# Patient Record
Sex: FEMALE | Race: WHITE | NOT HISPANIC OR LATINO
[De-identification: names, ages, dates, MRNs, and addresses within clinical notes are randomized per-mention and may not be internally consistent; named-entity substitution may affect disease eponyms.]

---

## 2017-11-27 ENCOUNTER — RX RENEWAL (OUTPATIENT)
Age: 72
End: 2017-11-27

## 2019-05-08 ENCOUNTER — RX RENEWAL (OUTPATIENT)
Age: 74
End: 2019-05-08

## 2019-05-30 ENCOUNTER — APPOINTMENT (OUTPATIENT)
Dept: OPHTHALMOLOGY | Facility: CLINIC | Age: 74
End: 2019-05-30
Payer: MEDICARE

## 2019-05-30 PROCEDURE — 76514 ECHO EXAM OF EYE THICKNESS: CPT

## 2019-05-30 PROCEDURE — 92014 COMPRE OPH EXAM EST PT 1/>: CPT

## 2019-05-30 PROCEDURE — 92286 ANT SGM IMG I&R SPECLR MIC: CPT

## 2020-10-29 ENCOUNTER — NON-APPOINTMENT (OUTPATIENT)
Age: 75
End: 2020-10-29

## 2023-04-05 ENCOUNTER — NON-APPOINTMENT (OUTPATIENT)
Age: 78
End: 2023-04-05

## 2023-07-12 ENCOUNTER — APPOINTMENT (OUTPATIENT)
Dept: INTERNAL MEDICINE | Facility: CLINIC | Age: 78
End: 2023-07-12
Payer: MEDICARE

## 2023-07-12 VITALS — BODY MASS INDEX: 24.49 KG/M2 | HEIGHT: 65 IN | HEART RATE: 81 BPM | WEIGHT: 147 LBS | OXYGEN SATURATION: 97 %

## 2023-07-12 VITALS — HEART RATE: 80 BPM | RESPIRATION RATE: 14 BRPM | SYSTOLIC BLOOD PRESSURE: 120 MMHG | DIASTOLIC BLOOD PRESSURE: 78 MMHG

## 2023-07-12 DIAGNOSIS — H04.123 DRY EYE SYNDROME OF BILATERAL LACRIMAL GLANDS: ICD-10-CM

## 2023-07-12 DIAGNOSIS — H18.519 ENDOTHELIAL CORNEAL DYSTROPHY, UNSPECIFIED EYE: ICD-10-CM

## 2023-07-12 DIAGNOSIS — S72.002S FRACTURE OF UNSPECIFIED PART OF NECK OF LEFT FEMUR, SEQUELA: ICD-10-CM

## 2023-07-12 DIAGNOSIS — E78.5 HYPERLIPIDEMIA, UNSPECIFIED: ICD-10-CM

## 2023-07-12 DIAGNOSIS — I10 ESSENTIAL (PRIMARY) HYPERTENSION: ICD-10-CM

## 2023-07-12 DIAGNOSIS — M81.0 AGE-RELATED OSTEOPOROSIS W/OUT CURRENT PATHOLOGICAL FRACTURE: ICD-10-CM

## 2023-07-12 DIAGNOSIS — Z86.39 PERSONAL HISTORY OF OTHER ENDOCRINE, NUTRITIONAL AND METABOLIC DISEASE: ICD-10-CM

## 2023-07-12 PROCEDURE — G0439: CPT

## 2023-07-12 RX ORDER — TELMISARTAN 20 MG/1
20 TABLET ORAL
Refills: 0 | Status: ACTIVE | COMMUNITY
Start: 2023-07-12

## 2023-07-12 NOTE — ASSESSMENT
[FreeTextEntry1] : 1. HCM- refuses consideration of any additional vaccinations\par              has aged out of breast, colon, cervical cancer screenings\par \par 2.s/p Total HIp Replacement- advised DEXA- did have T score less than - 3.2 iin 2012- and likely no better\par             WIll order a DEXA- consider treatment and review options after results\par \par 3. s/p TIA- [facility]- advised of need for asa 81 mg daily and for higher intensity statin- willing- R for atorvastatin 80- check labs in 1-2 mos\par \par 4. Musculoskeletal- Home PT for gait and balance and posture- history of fall/fx\par \par 5. HBP - goal less than 130/80= met\par \par Pt to sign record release and will contact when records received [facility] WIll start lipitor 40mg and asa 81 mg daily. WIll schedule DEXA

## 2023-07-12 NOTE — HISTORY OF PRESENT ILLNESS
[de-identified] : Pt last seen by me in 2014-  called in Oct 2020- pt had fall and fracture of hip and pelvis- THR- left- [facility]. Did f/u with ortho- Dr Rai. No follow up regarding bone health- no recent bone density.. Good result from surgery\par \par THen  phoned 4.23 to report on sx c/w TIA_ advised to go to hospital ED- went to [facility]- closest facility. Pt reports sudden onset of headache that she attributed to "sinuses" and then developed a visual abnormality and then garbled speech- lasted a few minutes. Took BP at home 'quite high'  called 911.IN hospital for 1-2 days- "all kinds of test"- "normal"- had been on amlodipine for BP - changed to telmisartan 20 mg dailiy- and aspirin- not taking asa reliably - and a statin. States fine since without any. neurologic sx. States put on 40mg atorvastatin but asked for dose to be reduced to 20mg\par \par States has not had any primary care since seen by me- involved with elderly mother who lives in [place]- "stress"

## 2023-07-12 NOTE — REVIEW OF SYSTEMS
[Joint Pain] : joint pain [Negative] : Psychiatric [FreeTextEntry9] : right knee and left hip- better- s/p l THR [de-identified] : see hpi

## 2023-07-12 NOTE — HEALTH RISK ASSESSMENT
[Good] : ~his/her~  mood as  good [Yes] : Yes [Monthly or less (1 pt)] : Monthly or less (1 point) [1 or 2 (0 pts)] : 1 or 2 (0 points) [Never (0 pts)] : Never (0 points) [No falls in past year] : Patient reported no falls in the past year [0] : 2) Feeling down, depressed, or hopeless: Not at all (0) [PHQ-2 Negative - No further assessment needed] : PHQ-2 Negative - No further assessment needed [HIV test declined] : HIV test declined [Hepatitis C test declined] : Hepatitis C test declined [None] : None [With Significant Other] : lives with significant other [Unemployed] : unemployed [] :  [# Of Children ___] : has [unfilled] children [Fully functional (bathing, dressing, toileting, transferring, walking, feeding)] : Fully functional (bathing, dressing, toileting, transferring, walking, feeding) [Fully functional (using the telephone, shopping, preparing meals, housekeeping, doing laundry, using] : Fully functional and needs no help or supervision to perform IADLs (using the telephone, shopping, preparing meals, housekeeping, doing laundry, using transportation, managing medications and managing finances) [With Patient/Caregiver] : , with patient/caregiver [Designated Healthcare Proxy] : Designated healthcare proxy [Name: ___] : Health Care Proxy's Name: [unfilled]  [I will adhere to the patient's wishes.] : I will adhere to the patient's wishes. [Never] : Never [FreeTextEntry1] : recent health issues- see HPI; stress related to care of elderly mother in [place] [de-identified] : see HPI- hosp at [facility] [de-identified] : Ortho [de-identified] : Walking, housework [de-identified] : regular [facility] [[facility]] : 0 [Change in mental status noted] : No change in mental status noted [Language] : denies difficulty with language [Behavior] : denies difficulty with behavior [Learning/Retaining New Information] : denies difficulty learning/retaining new information [Handling Complex Tasks] : denies difficulty handling complex tasks [Reasoning] : denies difficulty with reasoning [Spatial Ability and Orientation] : denies difficulty with spatial ability and orientation [Reports changes in hearing] : Reports no changes in hearing [Reports changes in vision] : Reports no changes in vision [Reports normal functional visual acuity (ie: able to read med bottle)] : Reports poor functional visual acuity.  [Reports changes in dental health] : Reports no changes in dental health [de-identified] : sherlynband-facing dialysis- son leaves nearby- daiughter in [place] [AdvancecareDate] : 07/24

## 2023-07-19 ENCOUNTER — APPOINTMENT (OUTPATIENT)
Dept: RADIOLOGY | Facility: CLINIC | Age: 78
End: 2023-07-19
Payer: MEDICARE

## 2023-07-19 ENCOUNTER — OUTPATIENT (OUTPATIENT)
Dept: OUTPATIENT SERVICES | Facility: HOSPITAL | Age: 78
LOS: 1 days | End: 2023-07-19
Payer: MEDICARE

## 2023-07-19 DIAGNOSIS — M81.0 AGE-RELATED OSTEOPOROSIS WITHOUT CURRENT PATHOLOGICAL FRACTURE: ICD-10-CM

## 2023-07-19 PROCEDURE — 77085 DXA BONE DENSITY AXL VRT FX: CPT | Mod: 26

## 2023-07-19 PROCEDURE — 77085 DXA BONE DENSITY AXL VRT FX: CPT

## 2023-07-21 DIAGNOSIS — G45.9 TRANSIENT CEREBRAL ISCHEMIC ATTACK, UNSPECIFIED: ICD-10-CM

## 2023-07-21 LAB
ALBUMIN SERPL ELPH-MCNC: 4.5
ALP BLD-CCNC: 111
ALT SERPL-CCNC: 14
AST SERPL-CCNC: 24
BILIRUB SERPL-MCNC: 0.4
BUN SERPL-MCNC: 18
CALCIUM SERPL-MCNC: 10.3
CHLORIDE SERPL-SCNC: 105
CHOLEST SERPL-MCNC: 224
CO2 SERPL-SCNC: 26
CREAT SERPL-MCNC: 0.8
GLUCOSE SERPL-MCNC: 93
HBA1C MFR BLD HPLC: 5.4
HCT VFR BLD CALC: 44.5
HDLC SERPL-MCNC: 68
HGB BLD-MCNC: 14.5
LDLC SERPL CALC-MCNC: 129
PLATELET # BLD AUTO: 249
POTASSIUM SERPL-SCNC: 4
PROT SERPL-MCNC: 7.8
SODIUM SERPL-SCNC: 140
TRIGL SERPL-MCNC: 134
WBC # FLD AUTO: 7.7

## 2023-07-21 RX ORDER — ASPIRIN 81 MG/1
81 TABLET, CHEWABLE ORAL DAILY
Refills: 0 | Status: ACTIVE | COMMUNITY
Start: 2023-07-12

## 2023-08-08 ENCOUNTER — LABORATORY RESULT (OUTPATIENT)
Age: 78
End: 2023-08-08

## 2023-08-09 LAB
25(OH)D3 SERPL-MCNC: 75.7 NG/ML
ALBUMIN MFR SERPL ELPH: 63.2 %
ALBUMIN SERPL-MCNC: 4.4 G/DL
ALBUMIN/GLOB SERPL: 1.7 RATIO
ALPHA1 GLOB MFR SERPL ELPH: 4 %
ALPHA1 GLOB SERPL ELPH-MCNC: 0.3 G/DL
ALPHA2 GLOB MFR SERPL ELPH: 10.6 %
ALPHA2 GLOB SERPL ELPH-MCNC: 0.7 G/DL
B-GLOBULIN MFR SERPL ELPH: 11.3 %
B-GLOBULIN SERPL ELPH-MCNC: 0.8 G/DL
CALCIUM SERPL-MCNC: 9.9 MG/DL
CHOLEST SERPL-MCNC: 201 MG/DL
GAMMA GLOB FLD ELPH-MCNC: 0.8 G/DL
GAMMA GLOB MFR SERPL ELPH: 10.9 %
HDLC SERPL-MCNC: 88 MG/DL
INTERPRETATION SERPL IEP-IMP: NORMAL
LDLC SERPL CALC-MCNC: 99 MG/DL
NONHDLC SERPL-MCNC: 113 MG/DL
PARATHYROID HORMONE INTACT: 41 PG/ML
PROT SERPL-MCNC: 7 G/DL
PROT SERPL-MCNC: 7 G/DL
T4 FREE SERPL-MCNC: 1.2 NG/DL
TRIGL SERPL-MCNC: 78 MG/DL
TSH SERPL-ACNC: 2.12 UIU/ML

## 2023-08-09 RX ORDER — ATORVASTATIN CALCIUM 80 MG/1
80 TABLET, FILM COATED ORAL DAILY
Qty: 90 | Refills: 3 | Status: ACTIVE | COMMUNITY
Start: 2023-07-12 | End: 1900-01-01

## 2023-08-09 RX ORDER — RISEDRONATE SODIUM 150 MG/1
150 TABLET, FILM COATED ORAL
Qty: 3 | Refills: 3 | Status: ACTIVE | COMMUNITY
Start: 2023-08-09 | End: 1900-01-01

## 2025-03-07 ENCOUNTER — NON-APPOINTMENT (OUTPATIENT)
Age: 80
End: 2025-03-07

## 2025-03-07 ENCOUNTER — APPOINTMENT (OUTPATIENT)
Dept: INTERNAL MEDICINE | Facility: CLINIC | Age: 80
End: 2025-03-07
Payer: MEDICARE

## 2025-03-07 ENCOUNTER — OUTPATIENT (OUTPATIENT)
Dept: OUTPATIENT SERVICES | Facility: HOSPITAL | Age: 80
LOS: 1 days | End: 2025-03-07
Payer: MEDICARE

## 2025-03-07 VITALS — WEIGHT: 148 LBS | BODY MASS INDEX: 26.22 KG/M2 | HEIGHT: 63 IN

## 2025-03-07 DIAGNOSIS — S72.002S FRACTURE OF UNSPECIFIED PART OF NECK OF LEFT FEMUR, SEQUELA: ICD-10-CM

## 2025-03-07 DIAGNOSIS — E78.5 HYPERLIPIDEMIA, UNSPECIFIED: ICD-10-CM

## 2025-03-07 DIAGNOSIS — M81.0 AGE-RELATED OSTEOPOROSIS W/OUT CURRENT PATHOLOGICAL FRACTURE: ICD-10-CM

## 2025-03-07 DIAGNOSIS — I10 ESSENTIAL (PRIMARY) HYPERTENSION: ICD-10-CM

## 2025-03-07 DIAGNOSIS — Z00.00 ENCOUNTER FOR GENERAL ADULT MEDICAL EXAMINATION W/OUT ABNORMAL FINDINGS: ICD-10-CM

## 2025-03-07 DIAGNOSIS — G45.9 TRANSIENT CEREBRAL ISCHEMIC ATTACK, UNSPECIFIED: ICD-10-CM

## 2025-03-07 PROCEDURE — G0463: CPT | Mod: 25

## 2025-03-07 PROCEDURE — G2211 COMPLEX E/M VISIT ADD ON: CPT

## 2025-03-07 PROCEDURE — 99214 OFFICE O/P EST MOD 30 MIN: CPT | Mod: 25

## 2025-03-07 PROCEDURE — G0439: CPT

## 2025-03-07 PROCEDURE — 93010 ELECTROCARDIOGRAM REPORT: CPT | Mod: 59

## 2025-03-07 RX ORDER — ALENDRONATE SODIUM 70 MG/1
70 TABLET ORAL
Qty: 12 | Refills: 3 | Status: ACTIVE | COMMUNITY
Start: 2025-03-07 | End: 1900-01-01

## 2025-03-10 DIAGNOSIS — S72.002S FRACTURE OF UNSPECIFIED PART OF NECK OF LEFT FEMUR, SEQUELA: ICD-10-CM

## 2025-03-10 DIAGNOSIS — Z00.00 ENCOUNTER FOR GENERAL ADULT MEDICAL EXAMINATION WITHOUT ABNORMAL FINDINGS: ICD-10-CM

## 2025-03-10 DIAGNOSIS — E78.5 HYPERLIPIDEMIA, UNSPECIFIED: ICD-10-CM

## 2025-03-10 DIAGNOSIS — M81.0 AGE-RELATED OSTEOPOROSIS WITHOUT CURRENT PATHOLOGICAL FRACTURE: ICD-10-CM

## 2025-03-11 LAB
ALBUMIN SERPL ELPH-MCNC: 4.4 G/DL
ALP BLD-CCNC: 116 U/L
ALT SERPL-CCNC: 10 U/L
ANION GAP SERPL CALC-SCNC: 11 MMOL/L
AST SERPL-CCNC: 19 U/L
BILIRUB SERPL-MCNC: 0.5 MG/DL
BUN SERPL-MCNC: 17 MG/DL
CALCIUM SERPL-MCNC: 9.8 MG/DL
CHLORIDE SERPL-SCNC: 102 MMOL/L
CHOLEST SERPL-MCNC: 178 MG/DL
CO2 SERPL-SCNC: 25 MMOL/L
CREAT SERPL-MCNC: 0.78 MG/DL
CREAT SPEC-SCNC: 94 MG/DL
CREAT SPEC-SCNC: 94 MG/DL
CREAT/PROT UR: 0.1 RATIO
EGFRCR SERPLBLD CKD-EPI 2021: 78 ML/MIN/1.73M2
GLUCOSE SERPL-MCNC: 95 MG/DL
HCT VFR BLD CALC: 43.2 %
HDLC SERPL-MCNC: 78 MG/DL
HGB BLD-MCNC: 13.6 G/DL
LDLC SERPL CALC-MCNC: 85 MG/DL
MCHC RBC-ENTMCNC: 30.5 PG
MCHC RBC-ENTMCNC: 31.5 G/DL
MCV RBC AUTO: 96.9 FL
MICROALBUMIN 24H UR DL<=1MG/L-MCNC: <1.2 MG/DL
MICROALBUMIN/CREAT 24H UR-RTO: NORMAL MG/G
NONHDLC SERPL-MCNC: 101 MG/DL
PLATELET # BLD AUTO: 284 K/UL
POTASSIUM SERPL-SCNC: 4.7 MMOL/L
PROT SERPL-MCNC: 6.9 G/DL
PROT UR-MCNC: 6 MG/DL
RBC # BLD: 4.46 M/UL
RBC # FLD: 14.9 %
SODIUM SERPL-SCNC: 138 MMOL/L
TRIGL SERPL-MCNC: 84 MG/DL
TSH SERPL-ACNC: 1.71 UIU/ML
WBC # FLD AUTO: 7.21 K/UL

## 2025-03-13 ENCOUNTER — TRANSCRIPTION ENCOUNTER (OUTPATIENT)
Age: 80
End: 2025-03-13

## 2025-07-16 ENCOUNTER — APPOINTMENT (OUTPATIENT)
Dept: INTERNAL MEDICINE | Facility: CLINIC | Age: 80
End: 2025-07-16